# Patient Record
Sex: FEMALE | Race: WHITE | NOT HISPANIC OR LATINO | URBAN - METROPOLITAN AREA
[De-identification: names, ages, dates, MRNs, and addresses within clinical notes are randomized per-mention and may not be internally consistent; named-entity substitution may affect disease eponyms.]

---

## 2023-08-14 ENCOUNTER — TELEMEDICINE (OUTPATIENT)
Dept: PRIMARY CARE | Facility: CLINIC | Age: 33
End: 2023-08-14

## 2023-08-14 DIAGNOSIS — R30.0 DYSURIA: Primary | ICD-10-CM

## 2023-08-14 PROCEDURE — 99212 OFFICE O/P EST SF 10 MIN: CPT | Performed by: FAMILY MEDICINE

## 2023-08-14 RX ORDER — SULFAMETHOXAZOLE AND TRIMETHOPRIM 800; 160 MG/1; MG/1
1 TABLET ORAL 2 TIMES DAILY
Qty: 6 TABLET | Refills: 0 | Status: SHIPPED | OUTPATIENT
Start: 2023-08-14 | End: 2023-08-17

## 2023-08-14 RX ORDER — SERTRALINE HYDROCHLORIDE 100 MG/1
TABLET, FILM COATED ORAL
COMMUNITY
End: 2023-08-14 | Stop reason: ALTCHOICE

## 2023-08-14 NOTE — PROGRESS NOTES
Sxs started on Saturday 2 days ago--     > 4 months since last UTI? Yes  Sxs have persisted for 2 days  Sxs are are worsening  Dysuria?Yes  Frequency? Yes  Urgency? Yes  Blood in urine? No  Abnormal vaginal bleeding? No  Abnormal vaginal pain? No  Abnormal vaginal discharge No  Flank pain? No  Abdominal pain?  No  H/o kidney stones?No  H/o kidney infection? No  OTC meds? No  Fever, chills, body aches? No  N,V,Diarrhea? Yes sl nausea yesterday  All other ROS (-)    Physical Exam:  Alert in NAD  Affect normal  Eyes clear  No resp distress or audible wheezing  CVA TTP  No  Abdominal TTP? No     A/P  Dysuria  No red flags  Bactrim DS as directed  Per pt--no chance of pregnancy-- no recent sexual encounters

## 2023-08-14 NOTE — PATIENT INSTRUCTIONS
Please send me a Pactt message if you have any questions or concerns.  FOR NON URGENT questions only.  Allow up to 72 hours for response.    If you have prescription issues or other questions you can email   Codey Garrison,  Digital Health Coordinator, at   [email or web address]  Bactrim DS as written   Rest and drink plenty of fluids  Follow up If no improvement or if symptoms worsen in 48 hours OR if symptoms persist after completing the antibiotics OR if you develop fevers, severe back or abdominal pain or any other concerning symptoms.     Rest and drink plenty of fluids    Tylenol and or motrin as needed for pain and fever (unless you have been told not to take these because of your personal medical history)    Discussed options and precautions:   Viral versus bacterial infection; use of medications; possible side effects; appropriate over-the-counter medications; possible complications and /or when to follow-up.    Follow-up in 1 to 2 days if not improving.  Follow-up immediately if symptoms worsen.    All red flags requiring in person care were discussed.  All patient's questions were answered.    Limitations to telemedicine include inability to do a complete and accurate physical exam.  Any concerns regarding this were conveyed with the patient and in person follow-up recommended if patient nature of illness does not progress as anticipated during this visit.

## 2024-06-13 ENCOUNTER — TELEMEDICINE (OUTPATIENT)
Dept: PRIMARY CARE | Facility: CLINIC | Age: 34
End: 2024-06-13

## 2024-06-13 DIAGNOSIS — R39.9 UTI SYMPTOMS: Primary | ICD-10-CM

## 2024-06-13 RX ORDER — NITROFURANTOIN 25; 75 MG/1; MG/1
100 CAPSULE ORAL 2 TIMES DAILY
Qty: 10 CAPSULE | Refills: 0 | Status: SHIPPED | OUTPATIENT
Start: 2024-06-13 | End: 2024-06-18

## 2024-06-13 ASSESSMENT — ENCOUNTER SYMPTOMS
CHEST TIGHTNESS: 0
LIGHT-HEADEDNESS: 0
APPETITE CHANGE: 0
HEADACHES: 0
ABDOMINAL PAIN: 0
VOMITING: 0
DIAPHORESIS: 0
FATIGUE: 0
SHORTNESS OF BREATH: 0
FREQUENCY: 1
CHILLS: 0
FLANK PAIN: 0
FEVER: 0
HEMATURIA: 0
NAUSEA: 0
DIZZINESS: 0
ACTIVITY CHANGE: 0

## 2024-06-13 NOTE — PROGRESS NOTES
Subjective   Patient ID: Soni Sales is a 33 y.o. female who presents for UTI.    Sx onset: 2 days  UTI previously: yes feels the same     UTI   The current episode started in the past 7 days. The problem occurs every urination. The problem has been waxing and waning. The quality of the pain is described as aching. The pain is mild. There has been no fever. She is Not sexually active. Associated symptoms include frequency, hesitancy and urgency. Pertinent negatives include no chills, flank pain, hematuria, nausea or vomiting. She has tried nothing for the symptoms.        Review of Systems   Constitutional:  Negative for activity change, appetite change, chills, diaphoresis, fatigue and fever.   Respiratory:  Negative for chest tightness and shortness of breath.    Cardiovascular:  Negative for chest pain.   Gastrointestinal:  Negative for abdominal pain, nausea and vomiting.   Genitourinary:  Positive for frequency, hesitancy and urgency. Negative for flank pain and hematuria.   Neurological:  Negative for dizziness, light-headedness and headaches.       Objective   There were no vitals taken for this visit.    Physical Exam  Constitutional:       General: She is not in acute distress.     Appearance: Normal appearance. She is not ill-appearing.      Comments: On Demand Virtual Visit Patient Consent     An interactive audio and video telecommunication system which permits real time communications between the patient (at the originating site) and provider (at the distant site) was utilized to provide this telehealth service.   Verbal consent was requested and obtained from Soni Sales (or parent if under 18) on this date, 03/27/24 for a telehealth visit.   I have verbally confirmed with Soni Sales (or parent if under 18) that they are physically located in the [facility] during this virtual visit.    Telemedicine appropriate evaluation completed.  Unable to perform complete physical exam due to virtual  visit, patient was visualized on interactive video.      Pulmonary:      Effort: Pulmonary effort is normal.   Neurological:      Mental Status: She is alert and oriented to person, place, and time.         Assessment/Plan   Diagnoses and all orders for this visit:  UTI symptoms  -     nitrofurantoin, macrocrystal-monohydrate, (Macrobid) 100 mg capsule; Take 1 capsule (100 mg) by mouth 2 times a day for 5 days.     UTI: instructed to drink plenty of water, avoid holding urine for long periods of time, and urinate after sexual intercoarse.  Follow up with PCP if symptoms persist or worsen  Complete entire coarse of antibiotic

## 2024-07-15 ENCOUNTER — TELEMEDICINE (OUTPATIENT)
Dept: PRIMARY CARE | Facility: CLINIC | Age: 34
End: 2024-07-15

## 2024-07-15 DIAGNOSIS — L30.9 DERMATITIS: Primary | ICD-10-CM

## 2024-07-15 PROBLEM — F32.A ACUTE DEPRESSION: Status: ACTIVE | Noted: 2024-07-15

## 2024-07-15 RX ORDER — PANTOPRAZOLE SODIUM 40 MG/1
40 TABLET, DELAYED RELEASE ORAL
COMMUNITY
Start: 2019-06-25

## 2024-07-15 RX ORDER — TRIAMCINOLONE ACETONIDE 0.25 MG/G
OINTMENT TOPICAL 2 TIMES DAILY
Qty: 15 G | Refills: 0 | Status: SHIPPED | OUTPATIENT
Start: 2024-07-15 | End: 2024-07-20

## 2024-07-15 NOTE — PROGRESS NOTES
An interactive audio and video telecommunication system which permits real time communications between the patient (at the originating site) and provider (at the distant site) was utilized to provide this telehealth service.   Verbal consent was requested and obtained from Soni Sales on this date, 07/15/24 for a telehealth visit.     Subjective    Soni Saels is a 33 y.o. year old female patient presenting for virtual visit   Chief Complaint   Patient presents with    lip rash        Upper lip is dry, flaky, crusty for a couple of weeks. Chapstick- several brands of lip balm. Not worsening, but not improving. No recent sunburn on lip. But had facial sunburn a couple weeks prior to that. Not itchy or swollen.     My upper lip has been dry/cracked/peeling for a few weeks now and nothing ale been using seems to help it. Only top lip though, my bottom is fine.             Patient Active Problem List   Diagnosis    Acute depression       History reviewed. No pertinent past medical history.   History reviewed. No pertinent surgical history.   No family history on file.   Social History     Tobacco Use    Smoking status: Not on file    Smokeless tobacco: Not on file   Substance Use Topics    Alcohol use: Not on file        Current Outpatient Medications:     pantoprazole (ProtoNix) 40 mg EC tablet, 1 tablet (40 mg)., Disp: , Rfl:     triamcinolone (Kenalog) 0.025 % ointment, Apply topically 2 times a day for 5 days., Disp: 15 g, Rfl: 0     Review of Systems    Review of Systems:   Constitutional: Denies fever  HEENT: Denies ST, earache  CVS: Denies Chest pain  Pulmonary: Denies wheezing, SOB  GI: Denies N/V  : Denies dysuria  Musculoskeletal:  Denies myalgia  Neuro: Denies focal weakness or numbness.  Skin: Denies Rashes.  *Review of Systems is negative unless otherwise mentioned in HPI or ROS above.     Objective    VITALS  Pt does not have vitals available at time of visit.    Exam       Limited physical exam in  virtual platform  Nontoxic. No acute distress.  Nonlabored breathing.  Flaking dry upper lip without cracking or bleeding or significant swelling    Assessment/Plan      Problem List Items Addressed This Visit    None  Visit Diagnoses       Dermatitis    -  Primary    Relevant Medications    triamcinolone (Kenalog) 0.025 % ointment

## 2025-01-20 ENCOUNTER — OFFICE VISIT (OUTPATIENT)
Dept: URGENT CARE | Age: 35
End: 2025-01-20

## 2025-01-20 DIAGNOSIS — R05.1 ACUTE COUGH: ICD-10-CM

## 2025-01-20 DIAGNOSIS — J06.9 ACUTE UPPER RESPIRATORY INFECTION: ICD-10-CM

## 2025-01-20 DIAGNOSIS — R50.9 FEVER, UNSPECIFIED FEVER CAUSE: Primary | ICD-10-CM

## 2025-01-20 RX ORDER — AZITHROMYCIN 250 MG/1
TABLET, FILM COATED ORAL
Qty: 6 TABLET | Refills: 0 | Status: SHIPPED | OUTPATIENT
Start: 2025-01-20

## 2025-01-20 NOTE — PROGRESS NOTES
Urgent Care Virtual Video Visit    Patient Location: Home in [place]  Provider Location: [facility]    Video visit completed with realtime synchronous video/audio connection.  I communicated my full name and active licensure.  Informed consent was obtained from the patient. Patient was made aware that my evaluation and diagnosis are limited due to the fact that we are not in the same room during the interview and that this is a virtual encounter that took place via videoconferencing. Patient verbalized understanding.     HPI/MDM:  34-year-old female presents to virtual visit for complaint of cough intermittently productive as well as intermittent fevers, sinus congestion, body aches that started on Friday 3 days ago.  States it is not improving however also not worsening.  States he does have sick contacts with her boyfriend having similar symptoms last week but states he is improved.  Denies any current chest pain, shortness of breath, abdominal pain, sinus pressure/pain, ear pain, sore throat, dysuria, constipation or diarrhea.  Patient denies any known drug allergies and denies daily medication use.  Discussed flu/COVID testing and patient declines this testing at this time.  Discussed possible viral URI versus atypical pneumonia.  Shared decision it was decided to try a trial of azithromycin and if no improvement in the next 2 to 3 days to return for an clinic visit.  Patient also educated on supportive care.  Return in clinic/ER precautions.  Follow-up with PCP 1 to 2 weeks.  Patient verbalizes that she agrees with this plan.    Physical exam:  Constitutional: Patient is nontoxic-appearing and in no acute distress  Head: Normocephalic, atraumatic, no tenderness to frontal and maxillary sinuses when patient presses  Nose: Congestion  Respiratory: Normal respiratory effort, no respiratory distress  Psych: Alert and oriented x 3    Patient disposition: Home    Electronically signed by Danish Espinal  PA-C  3:15 PM